# Patient Record
Sex: MALE | Race: ASIAN | NOT HISPANIC OR LATINO
[De-identification: names, ages, dates, MRNs, and addresses within clinical notes are randomized per-mention and may not be internally consistent; named-entity substitution may affect disease eponyms.]

---

## 2019-11-26 ENCOUNTER — OTHER (OUTPATIENT)
Age: 29
End: 2019-11-26

## 2019-11-26 ENCOUNTER — APPOINTMENT (OUTPATIENT)
Dept: PEDIATRIC ALLERGY IMMUNOLOGY | Facility: CLINIC | Age: 29
End: 2019-11-26
Payer: COMMERCIAL

## 2019-11-26 DIAGNOSIS — J30.2 OTHER ALLERGIC RHINITIS: ICD-10-CM

## 2019-11-26 DIAGNOSIS — J30.1 ALLERGIC RHINITIS DUE TO POLLEN: ICD-10-CM

## 2019-11-26 DIAGNOSIS — Z91.09 OTHER ALLERGY STATUS, OTHER THAN TO DRUGS AND BIOLOGICAL SUBSTANCES: ICD-10-CM

## 2019-11-26 DIAGNOSIS — Z00.00 ENCOUNTER FOR GENERAL ADULT MEDICAL EXAMINATION W/OUT ABNORMAL FINDINGS: ICD-10-CM

## 2019-11-26 DIAGNOSIS — J30.89 OTHER ALLERGIC RHINITIS: ICD-10-CM

## 2019-11-26 DIAGNOSIS — Z01.89 ENCOUNTER FOR OTHER SPECIFIED SPECIAL EXAMINATIONS: ICD-10-CM

## 2019-11-26 DIAGNOSIS — J30.81 ALLERGIC RHINITIS DUE TO ANIMAL (CAT) (DOG) HAIR AND DANDER: ICD-10-CM

## 2019-11-26 DIAGNOSIS — Z78.9 OTHER SPECIFIED HEALTH STATUS: ICD-10-CM

## 2019-11-26 PROCEDURE — 95004 PERQ TESTS W/ALRGNC XTRCS: CPT

## 2019-11-26 PROCEDURE — 95024 IQ TESTS W/ALLERGENIC XTRCS: CPT

## 2019-11-26 PROCEDURE — 99204 OFFICE O/P NEW MOD 45 MIN: CPT | Mod: 25

## 2019-12-03 ENCOUNTER — APPOINTMENT (OUTPATIENT)
Dept: PEDIATRIC ALLERGY IMMUNOLOGY | Facility: CLINIC | Age: 29
End: 2019-12-03
Payer: COMMERCIAL

## 2019-12-03 VITALS
SYSTOLIC BLOOD PRESSURE: 137 MMHG | DIASTOLIC BLOOD PRESSURE: 87 MMHG | HEART RATE: 74 BPM | HEIGHT: 67 IN | WEIGHT: 182.98 LBS | BODY MASS INDEX: 28.72 KG/M2

## 2019-12-03 PROCEDURE — 95117 IMMUNOTHERAPY INJECTIONS: CPT

## 2019-12-03 PROCEDURE — 95165 ANTIGEN THERAPY SERVICES: CPT

## 2019-12-05 PROBLEM — J30.1 ALLERGIC RHINITIS DUE TO POLLEN, UNSPECIFIED SEASONALITY: Status: ACTIVE | Noted: 2019-12-05

## 2019-12-05 PROBLEM — Z91.09 HOUSE DUST MITE ALLERGY: Status: ACTIVE | Noted: 2019-12-03

## 2019-12-05 PROBLEM — J30.89 PERENNIAL ALLERGIC RHINITIS: Status: ACTIVE | Noted: 2019-12-05

## 2019-12-05 PROBLEM — Z78.9 NO SECONDHAND SMOKE EXPOSURE: Status: ACTIVE | Noted: 2019-12-05

## 2019-12-05 PROBLEM — Z91.09 HOUSE DUST MITE ALLERGY: Status: ACTIVE | Noted: 2019-12-05

## 2019-12-05 PROBLEM — J30.89 SEASONAL AND PERENNIAL ALLERGIC RHINITIS: Status: ACTIVE | Noted: 2019-12-03

## 2019-12-05 PROBLEM — Z01.89 DIAGNOSTIC SKIN TEST: Status: ACTIVE | Noted: 2019-12-03

## 2019-12-05 PROBLEM — J30.81 ALLERGIC RHINITIS DUE TO ANIMAL HAIR AND DANDER: Status: ACTIVE | Noted: 2019-12-05

## 2019-12-05 RX ORDER — FLUTICASONE PROPIONATE 50 UG/1
50 SPRAY, METERED NASAL DAILY
Qty: 1 | Refills: 5 | Status: ACTIVE | COMMUNITY
Start: 2019-12-05 | End: 1900-01-01

## 2019-12-05 RX ORDER — EPINEPHRINE 0.3 MG/.3ML
0.3 INJECTION INTRAMUSCULAR
Qty: 1 | Refills: 0 | Status: ACTIVE | COMMUNITY
Start: 2019-12-05 | End: 1900-01-01

## 2019-12-05 NOTE — IMPRESSION
[Allergy Testing Dust Mite] : dust mites [Allergy Testing Dog] : dog [Allergy Testing Weeds] : weeds [Allergy Testing Trees] : trees [Allergy Testing Cat] : cat [Allergy Testing Mixed Feathers] : feathers [] : molds [Allergy Testing Cockroach] : cockroach [Allergy Testing Grasses] : grasses [FreeTextEntry1] : Mold mix A, mold mix B, mixed ragweed, mugwort.

## 2019-12-05 NOTE — PHYSICAL EXAM
[Boggy Nasal Turbinates] : boggy and/or pale nasal turbinates [Posterior Pharyngeal Cobblestoning] : posterior pharyngeal cobblestoning [Eczematous Patches] : no eczematous patches [Suborbital Bogginess] : no suborbital bogginess (allergic shiners) [Xerosis] : no xerosis

## 2019-12-05 NOTE — HISTORY OF PRESENT ILLNESS
[de-identified] : Lukas is a 30 yo male with allergic rhinitis who is here for initial evaluation. Lukas states that he has been suffering with environmental allergies for many years, it's mild during winter, but he has seasonal exacerbations, which start spring, once trees start pollinating, his symptoms are: runny nose, itchy eyes, itchy skin, sneezing, throat clearing, cough, patient takes claritin, sometime Flonase and it helps minimally. He is interested in starting immunotherapy. \par No concern for asthma, food allergy, eczema. \par No other issues. \par

## 2019-12-05 NOTE — SOCIAL HISTORY
[Dehumidifier] : does not use a dehumidifier [Cockroaches] : Patient states that there are no cockroaches in the home [Humidifier] : does not use a humidifier [Dust Mite Covers] : does not have dust mite covers [Smokers in Household] : there are no smokers in the home

## 2019-12-10 ENCOUNTER — APPOINTMENT (OUTPATIENT)
Dept: PEDIATRIC ALLERGY IMMUNOLOGY | Facility: CLINIC | Age: 29
End: 2019-12-10
Payer: COMMERCIAL

## 2019-12-10 LAB
ALBUMIN SERPL ELPH-MCNC: 4.5 G/DL
ALP BLD-CCNC: 65 U/L
ALT SERPL-CCNC: 18 U/L
ANION GAP SERPL CALC-SCNC: 14 MMOL/L
AST SERPL-CCNC: 18 U/L
BASOPHILS # BLD AUTO: 0.01 K/UL
BASOPHILS NFR BLD AUTO: 0.2 %
BILIRUB SERPL-MCNC: 0.7 MG/DL
BUN SERPL-MCNC: 19 MG/DL
CALCIUM SERPL-MCNC: 9 MG/DL
CHLORIDE SERPL-SCNC: 104 MMOL/L
CHOLEST SERPL-MCNC: 214 MG/DL
CHOLEST/HDLC SERPL: 3.3 RATIO
CO2 SERPL-SCNC: 25 MMOL/L
CREAT SERPL-MCNC: 0.94 MG/DL
EOSINOPHIL # BLD AUTO: 0.09 K/UL
EOSINOPHIL NFR BLD AUTO: 2 %
ESTIMATED AVERAGE GLUCOSE: 105 MG/DL
GLUCOSE SERPL-MCNC: 95 MG/DL
HBA1C MFR BLD HPLC: 5.3 %
HCT VFR BLD CALC: 45.1 %
HDLC SERPL-MCNC: 64 MG/DL
HGB BLD-MCNC: 14.3 G/DL
IMM GRANULOCYTES NFR BLD AUTO: 0.2 %
LDLC SERPL CALC-MCNC: 127 MG/DL
LYMPHOCYTES # BLD AUTO: 0.87 K/UL
LYMPHOCYTES NFR BLD AUTO: 19.7 %
MAN DIFF?: NORMAL
MCHC RBC-ENTMCNC: 29.4 PG
MCHC RBC-ENTMCNC: 31.7 GM/DL
MCV RBC AUTO: 92.6 FL
MONOCYTES # BLD AUTO: 0.33 K/UL
MONOCYTES NFR BLD AUTO: 7.5 %
NEUTROPHILS # BLD AUTO: 3.1 K/UL
NEUTROPHILS NFR BLD AUTO: 70.4 %
PLATELET # BLD AUTO: 205 K/UL
POTASSIUM SERPL-SCNC: 4.5 MMOL/L
PROT SERPL-MCNC: 6.6 G/DL
RBC # BLD: 4.87 M/UL
RBC # FLD: 12 %
SODIUM SERPL-SCNC: 143 MMOL/L
TRIGL SERPL-MCNC: 115 MG/DL
TSH SERPL-ACNC: 0.79 UIU/ML
WBC # FLD AUTO: 4.41 K/UL

## 2019-12-10 PROCEDURE — 95117 IMMUNOTHERAPY INJECTIONS: CPT

## 2019-12-10 PROCEDURE — 95165 ANTIGEN THERAPY SERVICES: CPT

## 2019-12-10 RX ORDER — FEXOFENADINE HYDROCHLORIDE 180 MG/1
180 TABLET ORAL DAILY
Qty: 30 | Refills: 2 | Status: DISCONTINUED | COMMUNITY
Start: 2019-12-05 | End: 2019-12-10

## 2019-12-17 ENCOUNTER — APPOINTMENT (OUTPATIENT)
Dept: PEDIATRIC ALLERGY IMMUNOLOGY | Facility: CLINIC | Age: 29
End: 2019-12-17
Payer: COMMERCIAL

## 2019-12-17 PROCEDURE — 95117 IMMUNOTHERAPY INJECTIONS: CPT

## 2019-12-17 PROCEDURE — 95165 ANTIGEN THERAPY SERVICES: CPT

## 2019-12-24 ENCOUNTER — APPOINTMENT (OUTPATIENT)
Dept: PEDIATRIC ALLERGY IMMUNOLOGY | Facility: CLINIC | Age: 29
End: 2019-12-24
Payer: COMMERCIAL

## 2019-12-24 DIAGNOSIS — Z51.6 ENCOUNTER FOR DESENSITIZATION TO ALLERGENS: ICD-10-CM

## 2019-12-24 PROCEDURE — 95165 ANTIGEN THERAPY SERVICES: CPT

## 2019-12-24 PROCEDURE — 95117 IMMUNOTHERAPY INJECTIONS: CPT

## 2020-04-25 ENCOUNTER — MESSAGE (OUTPATIENT)
Age: 30
End: 2020-04-25

## 2020-05-01 ENCOUNTER — APPOINTMENT (OUTPATIENT)
Dept: DISASTER EMERGENCY | Facility: CLINIC | Age: 30
End: 2020-05-01

## 2020-05-02 LAB
SARS-COV-2 IGG SERPL IA-ACNC: <0.1 INDEX
SARS-COV-2 IGG SERPL QL IA: NEGATIVE

## 2020-10-26 ENCOUNTER — APPOINTMENT (OUTPATIENT)
Dept: OPHTHALMOLOGY | Facility: CLINIC | Age: 30
End: 2020-10-26
Payer: COMMERCIAL

## 2020-10-26 ENCOUNTER — NON-APPOINTMENT (OUTPATIENT)
Age: 30
End: 2020-10-26

## 2020-10-26 PROCEDURE — 99072 ADDL SUPL MATRL&STAF TM PHE: CPT

## 2020-10-26 PROCEDURE — 92250 FUNDUS PHOTOGRAPHY W/I&R: CPT

## 2020-10-26 PROCEDURE — 92004 COMPRE OPH EXAM NEW PT 1/>: CPT

## 2020-10-27 ENCOUNTER — OUTPATIENT (OUTPATIENT)
Dept: OUTPATIENT SERVICES | Facility: HOSPITAL | Age: 30
LOS: 1 days | End: 2020-10-27
Payer: COMMERCIAL

## 2020-10-27 ENCOUNTER — APPOINTMENT (OUTPATIENT)
Dept: CT IMAGING | Facility: CLINIC | Age: 30
End: 2020-10-27
Payer: COMMERCIAL

## 2020-10-27 DIAGNOSIS — Z00.8 ENCOUNTER FOR OTHER GENERAL EXAMINATION: ICD-10-CM

## 2020-10-27 PROCEDURE — 70480 CT ORBIT/EAR/FOSSA W/O DYE: CPT | Mod: 26

## 2020-10-27 PROCEDURE — 70480 CT ORBIT/EAR/FOSSA W/O DYE: CPT

## 2020-10-29 ENCOUNTER — OUTPATIENT (OUTPATIENT)
Dept: OUTPATIENT SERVICES | Facility: HOSPITAL | Age: 30
LOS: 1 days | End: 2020-10-29
Payer: COMMERCIAL

## 2020-10-29 VITALS
DIASTOLIC BLOOD PRESSURE: 72 MMHG | HEART RATE: 73 BPM | SYSTOLIC BLOOD PRESSURE: 149 MMHG | WEIGHT: 188.05 LBS | OXYGEN SATURATION: 97 % | TEMPERATURE: 98 F | HEIGHT: 67 IN

## 2020-10-29 DIAGNOSIS — S02.30XA FRACTURE OF ORBITAL FLOOR, UNSPECIFIED SIDE, INITIAL ENCOUNTER FOR CLOSED FRACTURE: ICD-10-CM

## 2020-10-29 DIAGNOSIS — Z01.818 ENCOUNTER FOR OTHER PREPROCEDURAL EXAMINATION: ICD-10-CM

## 2020-10-29 DIAGNOSIS — K08.409 PARTIAL LOSS OF TEETH, UNSPECIFIED CAUSE, UNSPECIFIED CLASS: Chronic | ICD-10-CM

## 2020-10-29 LAB — SARS-COV-2 RNA SPEC QL NAA+PROBE: SIGNIFICANT CHANGE UP

## 2020-10-29 PROCEDURE — U0003: CPT

## 2020-10-29 PROCEDURE — G0463: CPT

## 2020-10-29 NOTE — H&P PST ADULT - NSANTHOSAYNRD_GEN_A_CORE
No. TAMIA screening performed.  STOP BANG Legend: 0-2 = LOW Risk; 3-4 = INTERMEDIATE Risk; 5-8 = HIGH Risk

## 2020-10-29 NOTE — H&P PST ADULT - NSICDXPASTMEDICALHX_GEN_ALL_CORE_FT
PAST MEDICAL HISTORY:  Pre-hypertension not on any medications-being closely monitored by Internist    Seasonal allergies     TB lung, latent last CXR 2017 patient reports was negative     PAST MEDICAL HISTORY:  Environmental allergies s/p immunotherapy    Pre-hypertension not on any medications-being closely monitored by Internist    TB lung, latent last CXR 2017 patient reports was negative

## 2020-10-29 NOTE — H&P PST ADULT - ATTENDING COMMENTS
For left orbital floor fracture repair.  Risks, benefits, options reviewed.  Risks of permanent diplopia, loss vision, numbness, need for additional surgery reviewed.  All questions answered.

## 2020-10-29 NOTE — H&P PST ADULT - NSICDXPROBLEM_GEN_ALL_CORE_FT
PROBLEM DIAGNOSES  Problem: Fracture of orbital floor  Assessment and Plan: Left Orbital Floor Fracture Repair
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
## 2020-10-30 ENCOUNTER — NON-APPOINTMENT (OUTPATIENT)
Age: 30
End: 2020-10-30

## 2020-10-30 ENCOUNTER — APPOINTMENT (OUTPATIENT)
Dept: OPHTHALMOLOGY | Facility: CLINIC | Age: 30
End: 2020-10-30
Payer: COMMERCIAL

## 2020-10-30 PROBLEM — Z22.7 LATENT TUBERCULOSIS: Chronic | Status: ACTIVE | Noted: 2020-10-29

## 2020-10-30 PROBLEM — R03.0 ELEVATED BLOOD-PRESSURE READING, WITHOUT DIAGNOSIS OF HYPERTENSION: Chronic | Status: ACTIVE | Noted: 2020-10-29

## 2020-10-30 PROBLEM — Z91.09 OTHER ALLERGY STATUS, OTHER THAN TO DRUGS AND BIOLOGICAL SUBSTANCES: Chronic | Status: ACTIVE | Noted: 2020-10-29

## 2020-10-30 PROCEDURE — 92060 SENSORIMOTOR EXAMINATION: CPT

## 2020-10-30 PROCEDURE — 92012 INTRM OPH EXAM EST PATIENT: CPT

## 2020-10-30 PROCEDURE — 99072 ADDL SUPL MATRL&STAF TM PHE: CPT

## 2020-10-30 RX ORDER — LIDOCAINE HCL 20 MG/ML
0.2 VIAL (ML) INJECTION ONCE
Refills: 0 | Status: DISCONTINUED | OUTPATIENT
Start: 2020-11-02 | End: 2020-11-16

## 2020-10-30 RX ORDER — SODIUM CHLORIDE 9 MG/ML
3 INJECTION INTRAMUSCULAR; INTRAVENOUS; SUBCUTANEOUS EVERY 8 HOURS
Refills: 0 | Status: DISCONTINUED | OUTPATIENT
Start: 2020-11-02 | End: 2020-11-16

## 2020-11-01 ENCOUNTER — TRANSCRIPTION ENCOUNTER (OUTPATIENT)
Age: 30
End: 2020-11-01

## 2020-11-01 RX ORDER — SODIUM CHLORIDE 9 MG/ML
1000 INJECTION, SOLUTION INTRAVENOUS
Refills: 0 | Status: DISCONTINUED | OUTPATIENT
Start: 2020-11-02 | End: 2020-11-16

## 2020-11-01 RX ORDER — ONDANSETRON 8 MG/1
4 TABLET, FILM COATED ORAL ONCE
Refills: 0 | Status: DISCONTINUED | OUTPATIENT
Start: 2020-11-02 | End: 2020-11-16

## 2020-11-01 RX ORDER — OXYCODONE HYDROCHLORIDE 5 MG/1
5 TABLET ORAL ONCE
Refills: 0 | Status: DISCONTINUED | OUTPATIENT
Start: 2020-11-02 | End: 2020-11-02

## 2020-11-02 ENCOUNTER — OUTPATIENT (OUTPATIENT)
Dept: OUTPATIENT SERVICES | Facility: HOSPITAL | Age: 30
LOS: 1 days | End: 2020-11-02
Payer: COMMERCIAL

## 2020-11-02 VITALS
DIASTOLIC BLOOD PRESSURE: 86 MMHG | SYSTOLIC BLOOD PRESSURE: 137 MMHG | TEMPERATURE: 98 F | HEART RATE: 71 BPM | WEIGHT: 188.05 LBS | HEIGHT: 67 IN | RESPIRATION RATE: 16 BRPM | OXYGEN SATURATION: 100 %

## 2020-11-02 VITALS
TEMPERATURE: 98 F | SYSTOLIC BLOOD PRESSURE: 140 MMHG | HEART RATE: 99 BPM | OXYGEN SATURATION: 99 % | RESPIRATION RATE: 20 BRPM | DIASTOLIC BLOOD PRESSURE: 78 MMHG

## 2020-11-02 DIAGNOSIS — K08.409 PARTIAL LOSS OF TEETH, UNSPECIFIED CAUSE, UNSPECIFIED CLASS: Chronic | ICD-10-CM

## 2020-11-02 DIAGNOSIS — S02.30XA FRACTURE OF ORBITAL FLOOR, UNSPECIFIED SIDE, INITIAL ENCOUNTER FOR CLOSED FRACTURE: ICD-10-CM

## 2020-11-02 PROCEDURE — 21407 OPN TX ORBIT FX W/IMPLANT: CPT | Mod: LT

## 2020-11-02 PROCEDURE — C1889: CPT

## 2020-11-02 RX ORDER — CETIRIZINE HYDROCHLORIDE 10 MG/1
1 TABLET ORAL
Qty: 0 | Refills: 0 | DISCHARGE

## 2020-11-02 RX ORDER — SODIUM CHLORIDE 9 MG/ML
3 INJECTION INTRAMUSCULAR; INTRAVENOUS; SUBCUTANEOUS EVERY 8 HOURS
Refills: 0 | Status: DISCONTINUED | OUTPATIENT
Start: 2020-11-02 | End: 2020-11-02

## 2020-11-02 RX ORDER — LIDOCAINE HCL 20 MG/ML
0.2 VIAL (ML) INJECTION ONCE
Refills: 0 | Status: DISCONTINUED | OUTPATIENT
Start: 2020-11-02 | End: 2020-11-02

## 2020-11-02 NOTE — PRE-ANESTHESIA EVALUATION ADULT - NSANTHOSAYNRD_GEN_A_CORE
No. TAMIA screening performed.  STOP BANG Legend: 0-2 = LOW Risk; 3-4 = INTERMEDIATE Risk; 5-8 = HIGH Risk
No. TAMIA screening performed.  STOP BANG Legend: 0-2 = LOW Risk; 3-4 = INTERMEDIATE Risk; 5-8 = HIGH Risk

## 2020-11-02 NOTE — PRE-ANESTHESIA EVALUATION ADULT - MALLAMPATI CLASS
Class I (easy) - visualization of the soft palate, fauces, uvula, and both anterior and posterior pillars
Class IV (difficult) - the soft palate is not visible at all

## 2020-11-02 NOTE — ASU DISCHARGE PLAN (ADULT/PEDIATRIC) - ASU DC SPECIAL INSTRUCTIONSFT
1. Please rest at home for the first 24 hours and do not drive, make any important decisions, or operate hazardous machinery.  2. No alcohol ingestion for the first 24 hours post-operatively.  3. No bending, no lifting for 7 days. For patients who had tear duct surgery, do not blow nose for 7 days.  4. It is recommended that you shower and get your face and stitches wet.  5. Normal diet.  6. Tylenol may be used for discomfort- 500 milligrams every 6 hours only if needed.  7. Expect increased black and blue and slight bloody ooze for 24-48 hours. Please apply cool compresses to the operative site for the first 24 hours. Ten minutes on and 10 minutes off while awake should suffice.  8. Resume all pre-operative medications today. You should resume aspirin, plavix, and/or coumadin today if you routinely take such.  9. Apply antibiotic ointment to the incision line at sleep.  10. Please call the office today for an appointment in 1 week.  11. Any questions, please call the office. In case of emergency, please contact the office  first. If you are unable to do so, call 911 and/or proceed to the nearest emergency room.  12. Check vision in left eye every few hours by closing right eye

## 2020-11-02 NOTE — ASU DISCHARGE PLAN (ADULT/PEDIATRIC) - NURSING INSTRUCTIONS
Next dose of Tylenol will be on or after _1100PM__________ ,today/tonight and every 6 hours afterwards for pain management, do not take any Tylenol containing products until this time. Your first dose of Tylenol was given at _0500PM__________. Do not exceed more than 4000mg of Tylenol in one 24 hour setting.

## 2020-12-08 RX ORDER — LEVOCETIRIZINE DIHYDROCHLORIDE 5 MG/1
5 TABLET ORAL DAILY
Qty: 30 | Refills: 5 | Status: ACTIVE | COMMUNITY
Start: 2019-12-10 | End: 1900-01-01

## 2020-12-18 ENCOUNTER — APPOINTMENT (OUTPATIENT)
Dept: OPHTHALMOLOGY | Facility: CLINIC | Age: 30
End: 2020-12-18
Payer: COMMERCIAL

## 2020-12-18 ENCOUNTER — NON-APPOINTMENT (OUTPATIENT)
Age: 30
End: 2020-12-18

## 2020-12-18 PROCEDURE — 92012 INTRM OPH EXAM EST PATIENT: CPT

## 2020-12-18 PROCEDURE — 92060 SENSORIMOTOR EXAMINATION: CPT

## 2020-12-18 PROCEDURE — 99072 ADDL SUPL MATRL&STAF TM PHE: CPT

## 2021-01-26 DIAGNOSIS — R21 RASH AND OTHER NONSPECIFIC SKIN ERUPTION: ICD-10-CM

## 2021-03-16 RX ORDER — ALBUTEROL SULFATE 90 UG/1
108 (90 BASE) AEROSOL, METERED RESPIRATORY (INHALATION)
Qty: 1 | Refills: 5 | Status: ACTIVE | COMMUNITY
Start: 2021-03-16 | End: 1900-01-01

## 2021-04-22 RX ORDER — ERYTHROMYCIN 5 MG/G
5 OINTMENT OPHTHALMIC
Qty: 1 | Refills: 2 | Status: ACTIVE | COMMUNITY
Start: 2021-04-22 | End: 1900-01-01

## 2021-05-21 ENCOUNTER — APPOINTMENT (OUTPATIENT)
Dept: OPHTHALMOLOGY | Facility: CLINIC | Age: 31
End: 2021-05-21

## 2021-06-18 ENCOUNTER — APPOINTMENT (OUTPATIENT)
Dept: OPHTHALMOLOGY | Facility: CLINIC | Age: 31
End: 2021-06-18

## 2022-03-31 ENCOUNTER — APPOINTMENT (OUTPATIENT)
Dept: VASCULAR SURGERY | Facility: CLINIC | Age: 32
End: 2022-03-31
Payer: COMMERCIAL

## 2022-03-31 VITALS
DIASTOLIC BLOOD PRESSURE: 86 MMHG | SYSTOLIC BLOOD PRESSURE: 144 MMHG | WEIGHT: 182 LBS | BODY MASS INDEX: 28.56 KG/M2 | HEIGHT: 67 IN | HEART RATE: 79 BPM

## 2022-03-31 DIAGNOSIS — I83.10 VARICOSE VEINS OF UNSPECIFIED LOWER EXTREMITY WITH INFLAMMATION: ICD-10-CM

## 2022-03-31 DIAGNOSIS — I87.2 VENOUS INSUFFICIENCY (CHRONIC) (PERIPHERAL): ICD-10-CM

## 2022-03-31 PROCEDURE — 93971 EXTREMITY STUDY: CPT

## 2022-03-31 PROCEDURE — 99203 OFFICE O/P NEW LOW 30 MIN: CPT

## 2022-03-31 NOTE — PHYSICAL EXAM
[Carotid Bruits] : no carotid bruits [Respiratory Effort] : normal respiratory effort [Normal Rate and Rhythm] : normal rate and rhythm [2+] : right 2+ [Ankle Swelling (On Exam)] : present [Ankle Swelling On The Right] : mild [Varicose Veins Of Lower Extremities] : present [Varicose Veins Of The Right Leg] : of the right leg [Ankle Swelling On The Left] : moderate [] : not present [Abdomen Tenderness] : ~T ~M No abdominal tenderness [Alert] : alert [Calm] : calm [de-identified] : WN/WD, NAD [de-identified] : NC/AT [de-identified] : supple [de-identified] : +FROM 5/5x4 [de-identified] : RLE: large, bulging varicose veins over medial calf [de-identified] : grossly intact

## 2022-03-31 NOTE — ADDENDUM
[FreeTextEntry1] : I, Dr.Khalil Dey, personally performed the evaluation and management (E/M) services for this new patient.  That E/M includes conducting the initial examination, assessing all conditions, and establishing the plan of care.  Today, my ACP, ZORA Hoff, was here to observe my evaluation and management services for this patient to be followed going forward.\par \par \par

## 2022-03-31 NOTE — PROCEDURE
[FreeTextEntry1] : RLE venous doppler was done in the office demonstrating GSV reflux and gross varicosities, no SVT/DVT

## 2022-03-31 NOTE — ASSESSMENT
[FreeTextEntry1] : 31 year old M presents for initial consultation due to right lower extremity varicose veins. \par Symptomatic right leg varicose veins that interfere with daily activities.\par RLE venous doppler was done in the office demonstrating GSV reflux and gross varicosities, no SVT/DVT.\par We discussed the findings. Patient failed conservative measures and will benefit from R GSV RFA and Stabs later.\par RAB were discussed, all questions answered.\par Patient understands and would like to proceed.\par We will schedule him for R GSV RFA once we get an approval from his insurance.\par  [Arterial/Venous Disease] : arterial/venous disease

## 2022-03-31 NOTE — HISTORY OF PRESENT ILLNESS
[FreeTextEntry1] : 31 year old M presents for initial consultation due to right lower extremity varicose veins. Patient has been having a long standing history of varicosities but over the years his varicose veins became large, more pronounced, tender to touch.He states that his varicose veins burn, itch and interfere with daily activities. Patient has been wearing compression stockings (20-30 mmHg) for about a year with minimal relief. Patient states that it became difficult for him to stay for prolonged period of time (> 1/2 hr). Patient occasionally takes over the counter pain medications to alleviate his  symptoms.\par Patient denies hx of DVT/SVT, bleeding from his  varicosities, claudication.\par

## 2022-04-26 RX ORDER — TRIAMCINOLONE ACETONIDE 0.25 MG/G
0.03 OINTMENT TOPICAL
Qty: 2 | Refills: 1 | Status: ACTIVE | COMMUNITY
Start: 2021-01-26 | End: 1900-01-01

## 2022-06-22 NOTE — PROCEDURE
[FreeTextEntry1] : Right GSV RFA [FreeTextEntry2] : Symptomatic varicose veins and GSV reflux [FreeTextEntry3] : Procedure: The patient’s leg was prepped and draped. Under local 1% Lidocaine the greater saphenous vein was punctured at the knee with a micropuncture needle and then the guidewire was inserted into the vein. This was performed under ultrasound guidance. The skin was incised with a #11 Blade, a dilator was inserted and then the 7 Fr. Introducer was placed into the greater saphenous vein. \par \par The fossa ovalis was visualized with the Duplex scan. The common femoral vein was confirmed to be patent. Duplex examination of the greater saphenous vein from the calf to the groin was performed. The greater saphenous and inferior epigastric vein were identified and the VNUS catheter was introduced through the introducer and into the vein up to the fossa ovalis. It was positioned 3 cm distal to the inferior epigastric vein.\par \par Tumescent solution (400 cc normal saline, 4cc of 8.4% Sodium bicarbonate and 40 cc 1% Lidoaine) was infiltrated subfascially over the vein. \par \par The VNUS ClosureFAST catheter checked for proper function. Thermal ablation was begun after the position of the catheter was once again confirmed to be 3 cm distal to the inferior epigastric branch of the greater saphenous vein. The proximal 7 cm was treated twice and then the rest of the vein was treated with single 20 sec cycles. The sheath and catheter were removed. Compression was applied for hemostasis. \par \par Confirmation of common femoral vein patency and occlusion of the greater saphenous vein was made with Duplex ultrasonography. \par \par \par

## 2022-06-23 ENCOUNTER — APPOINTMENT (OUTPATIENT)
Dept: VASCULAR SURGERY | Facility: CLINIC | Age: 32
End: 2022-06-23

## 2022-06-23 PROCEDURE — 36475 ENDOVENOUS RF 1ST VEIN: CPT | Mod: RT

## 2022-06-28 ENCOUNTER — APPOINTMENT (OUTPATIENT)
Dept: VASCULAR SURGERY | Facility: CLINIC | Age: 32
End: 2022-06-28
Payer: COMMERCIAL

## 2022-06-28 PROCEDURE — 93971 EXTREMITY STUDY: CPT

## 2022-09-27 ENCOUNTER — APPOINTMENT (OUTPATIENT)
Dept: DERMATOLOGY | Facility: CLINIC | Age: 32
End: 2022-09-27

## 2023-10-19 ENCOUNTER — RESULT REVIEW (OUTPATIENT)
Age: 33
End: 2023-10-19

## 2024-08-01 NOTE — ASU PATIENT PROFILE, ADULT - PMH
no edema,  no murmurs,  regular rate and rhythm
Environmental allergies  s/p immunotherapy  Pre-hypertension  not on any medications-being closely monitored by Internist  TB lung, latent  last CXR 2017 patient reports was negative